# Patient Record
Sex: FEMALE | Race: WHITE | NOT HISPANIC OR LATINO | Employment: PART TIME | URBAN - NONMETROPOLITAN AREA
[De-identification: names, ages, dates, MRNs, and addresses within clinical notes are randomized per-mention and may not be internally consistent; named-entity substitution may affect disease eponyms.]

---

## 2024-09-03 ENCOUNTER — OFFICE VISIT (OUTPATIENT)
Dept: GASTROENTEROLOGY | Facility: CLINIC | Age: 44
End: 2024-09-03
Payer: MEDICAID

## 2024-09-03 ENCOUNTER — LAB (OUTPATIENT)
Dept: LAB | Facility: HOSPITAL | Age: 44
End: 2024-09-03
Payer: MEDICAID

## 2024-09-03 VITALS
OXYGEN SATURATION: 99 % | WEIGHT: 179 LBS | HEART RATE: 97 BPM | HEIGHT: 66 IN | TEMPERATURE: 96.4 F | SYSTOLIC BLOOD PRESSURE: 86 MMHG | DIASTOLIC BLOOD PRESSURE: 66 MMHG | BODY MASS INDEX: 28.77 KG/M2

## 2024-09-03 DIAGNOSIS — K51.919 ULCERATIVE COLITIS WITH COMPLICATION, UNSPECIFIED LOCATION: Primary | ICD-10-CM

## 2024-09-03 DIAGNOSIS — K51.919 ULCERATIVE COLITIS WITH COMPLICATION, UNSPECIFIED LOCATION: ICD-10-CM

## 2024-09-03 LAB
ALBUMIN SERPL-MCNC: 3.6 G/DL (ref 3.5–5.2)
ALBUMIN/GLOB SERPL: 0.9 G/DL
ALP SERPL-CCNC: 77 U/L (ref 39–117)
ALT SERPL W P-5'-P-CCNC: 9 U/L (ref 1–33)
ANION GAP SERPL CALCULATED.3IONS-SCNC: 7 MMOL/L (ref 5–15)
AST SERPL-CCNC: 11 U/L (ref 1–32)
BILIRUB SERPL-MCNC: <0.2 MG/DL (ref 0–1.2)
BUN SERPL-MCNC: 7 MG/DL (ref 6–20)
BUN/CREAT SERPL: 9.9 (ref 7–25)
CALCIUM SPEC-SCNC: 8.9 MG/DL (ref 8.6–10.5)
CHLORIDE SERPL-SCNC: 102 MMOL/L (ref 98–107)
CO2 SERPL-SCNC: 28 MMOL/L (ref 22–29)
CREAT SERPL-MCNC: 0.71 MG/DL (ref 0.57–1)
CRP SERPL-MCNC: 1.61 MG/DL (ref 0–0.5)
DEPRECATED RDW RBC AUTO: 42.7 FL (ref 37–54)
EGFRCR SERPLBLD CKD-EPI 2021: 107 ML/MIN/1.73
ERYTHROCYTE [DISTWIDTH] IN BLOOD BY AUTOMATED COUNT: 13.1 % (ref 12.3–15.4)
ERYTHROCYTE [SEDIMENTATION RATE] IN BLOOD: 35 MM/HR (ref 0–20)
GLOBULIN UR ELPH-MCNC: 3.9 GM/DL
GLUCOSE SERPL-MCNC: 111 MG/DL (ref 65–99)
HCT VFR BLD AUTO: 39.4 % (ref 34–46.6)
HGB BLD-MCNC: 12.3 G/DL (ref 12–15.9)
MCH RBC QN AUTO: 28 PG (ref 26.6–33)
MCHC RBC AUTO-ENTMCNC: 31.2 G/DL (ref 31.5–35.7)
MCV RBC AUTO: 89.5 FL (ref 79–97)
PLATELET # BLD AUTO: 386 10*3/MM3 (ref 140–450)
PMV BLD AUTO: 7.9 FL (ref 6–12)
POTASSIUM SERPL-SCNC: 4.7 MMOL/L (ref 3.5–5.2)
PROT SERPL-MCNC: 7.5 G/DL (ref 6–8.5)
RBC # BLD AUTO: 4.4 10*6/MM3 (ref 3.77–5.28)
SODIUM SERPL-SCNC: 137 MMOL/L (ref 136–145)
WBC NRBC COR # BLD AUTO: 9.48 10*3/MM3 (ref 3.4–10.8)

## 2024-09-03 PROCEDURE — 36415 COLL VENOUS BLD VENIPUNCTURE: CPT

## 2024-09-03 PROCEDURE — 85652 RBC SED RATE AUTOMATED: CPT

## 2024-09-03 PROCEDURE — 80053 COMPREHEN METABOLIC PANEL: CPT

## 2024-09-03 PROCEDURE — 86140 C-REACTIVE PROTEIN: CPT

## 2024-09-03 PROCEDURE — 85027 COMPLETE CBC AUTOMATED: CPT

## 2024-09-03 NOTE — PROGRESS NOTES
"[facility] GASTROENTEROLOGY - OFFICE NOTE    9/3/2024    Brandi Hsieh   1980    Primary Physician: Provider, No Known    Chief Complaint   Patient presents with    Ulcerative Colitis         HISTORY OF PRESENT ILLNESS:     Brandi Hsieh is a 44 y.o. female presents  with ulcerative colitis. Diagnosed at age 27. She has been followed at [facility] in [place], [place]. Moved here to [place] in the last couple of months.         Currently seen in the urgent care here at [facility]  8-9-24 for abdominal pain and rectal bleeding. She was also having diarrhea. Was given medrol dose ange. She is asymptomatic now.  No fever. No n/v. No joint pain, ocular pain, or skin rash.        She had acute ulcerative colitis flare 2/2024 when living in [place] , hospitalized for 5 days, received IV steroids.       Has been on entyvio for a \" few years\".  Increased from once every 8 weeks to once per month 5/2024 ( last dose). She reports at that time was under a lot of stress.  Occasionally would have to take steroids in the past. Has not taken any other meds for ulcerative colitis.       Had flu vaccine, no shingles vaccine,  due for covid.   Up to date on pna.   No dermatology appointment in the last year.   No gyn appointment in the last 2 year.             Last colonoscopy was this year. Recall 1 year.   Has had colon polyps.   Kain BURCH had colon cancer.       Past Medical History:   Diagnosis Date    Depressed     Ulcerative colitis        Past Surgical History:   Procedure Laterality Date    TYMPANOPLASTY         Outpatient Medications Marked as Taking for the 9/3/24 encounter (Office Visit) with Kallie Murillo APRN   Medication Sig Dispense Refill    FLUoxetine (PROzac) 40 MG capsule Take  by mouth Daily.      prazosin (MINIPRESS) 2 MG capsule Take 1 capsule by mouth Every Night.      vedolizumab (Entyvio) 300 MG injection Infuse 5 mL into a venous catheter 1 (One) Time.         No Known Allergies    Social History " "    Socioeconomic History    Marital status: Single   Tobacco Use    Smoking status: Never    Smokeless tobacco: Never   Vaping Use    Vaping status: Never Used   Substance and Sexual Activity    Alcohol use: Yes     Comment: occ    Drug use: Not Currently    Sexual activity: Defer       Family History   Problem Relation Age of Onset    Colon cancer Maternal Grandmother        Review of Systems   Constitutional:  Negative for chills, fever and unexpected weight change.   Respiratory:  Negative for shortness of breath.    Cardiovascular:  Negative for chest pain.   Gastrointestinal:  Negative for abdominal distention, abdominal pain, anal bleeding, blood in stool, constipation, diarrhea, nausea and vomiting.        Vitals:    09/03/24 0810   BP: (!) 86/66   Pulse: 97   Temp: 96.4 °F (35.8 °C)   SpO2: 99%   Weight: 81.2 kg (179 lb)   Height: 167.6 cm (66\")      Body mass index is 28.89 kg/m².    Physical Exam  Vitals reviewed.   Constitutional:       General: She is not in acute distress.  Cardiovascular:      Rate and Rhythm: Normal rate and regular rhythm.      Heart sounds: Normal heart sounds.   Pulmonary:      Effort: Pulmonary effort is normal.      Breath sounds: Normal breath sounds.   Abdominal:      General: Bowel sounds are normal. There is no distension.      Palpations: Abdomen is soft.      Tenderness: There is no abdominal tenderness.   Skin:     General: Skin is warm and dry.   Neurological:      Mental Status: She is alert.         No results found for this or any previous visit.        ASSESSMENT AND PLAN    Assessment & Plan     Diagnoses and all orders for this visit:    1. Ulcerative colitis with complication, unspecified location (Primary)  -     CBC (No Diff); Future  -     Sedimentation Rate; Future  -     C-reactive Protein; Future  -     Comprehensive Metabolic Panel; Future      She is asymptomatic now. I recommend check labs.  I will request records from [facility] in [place], [place] for " review and contact patient.    I recommend office visit 3 mo. She is to call with any questions concerns or problems.  I am also going to contact the Entyvio rep in regards to dosing of Entyvio since she has been off.          * Surgery not found *           Return in about 3 months (around 12/3/2024).        There are no Patient Instructions on file for this visit.      Kalile Murillo, AZAEL      No results found for this or any previous visit.

## 2024-09-06 ENCOUNTER — PATIENT ROUNDING (BHMG ONLY) (OUTPATIENT)
Dept: GASTROENTEROLOGY | Facility: CLINIC | Age: 44
End: 2024-09-06
Payer: MEDICAID

## 2024-09-09 ENCOUNTER — TELEPHONE (OUTPATIENT)
Dept: GASTROENTEROLOGY | Facility: CLINIC | Age: 44
End: 2024-09-09
Payer: MEDICAID

## 2024-09-09 NOTE — TELEPHONE ENCOUNTER
Anthony, can you get the paperwork for entyvio going please? Thank you           ----- Message from Anthony GOLD sent at 9/9/2024  7:44 AM CDT -----  Regarding: FW: Labs  Contact: [phone]    ----- Message -----  From: Brandi Hsieh  Sent: 9/8/2024   8:55 AM CDT  To: [facility] Clinical Pool  Subject: Labs                                             Thank you for letting me know. I am not really surprised. Just let me know when we can get going.

## 2024-09-16 PROBLEM — K51.919: Status: ACTIVE | Noted: 2024-09-16

## 2024-09-16 RX ORDER — SODIUM CHLORIDE 9 MG/ML
20 INJECTION, SOLUTION INTRAVENOUS ONCE
OUTPATIENT
Start: 2024-09-30

## 2024-09-17 ENCOUNTER — TRANSCRIBE ORDERS (OUTPATIENT)
Dept: ONCOLOGY | Facility: HOSPITAL | Age: 44
End: 2024-09-17
Payer: MEDICAID

## 2024-09-17 DIAGNOSIS — K51.919 MODERATE CHRONIC ULCERATIVE COLITIS WITH COMPLICATION: Primary | ICD-10-CM

## 2024-09-30 ENCOUNTER — INFUSION (OUTPATIENT)
Dept: ONCOLOGY | Facility: HOSPITAL | Age: 44
End: 2024-09-30
Payer: MEDICAID

## 2024-09-30 ENCOUNTER — LAB (OUTPATIENT)
Dept: LAB | Facility: HOSPITAL | Age: 44
End: 2024-09-30
Payer: MEDICAID

## 2024-09-30 VITALS
SYSTOLIC BLOOD PRESSURE: 103 MMHG | HEART RATE: 69 BPM | WEIGHT: 182.4 LBS | OXYGEN SATURATION: 98 % | RESPIRATION RATE: 18 BRPM | HEIGHT: 66 IN | TEMPERATURE: 97.5 F | DIASTOLIC BLOOD PRESSURE: 69 MMHG | BODY MASS INDEX: 29.32 KG/M2

## 2024-09-30 DIAGNOSIS — K51.919 MODERATE CHRONIC ULCERATIVE COLITIS WITH COMPLICATION: ICD-10-CM

## 2024-09-30 DIAGNOSIS — K51.919 MODERATE CHRONIC ULCERATIVE COLITIS WITH COMPLICATION: Primary | ICD-10-CM

## 2024-09-30 LAB
BASOPHILS # BLD AUTO: 0.05 10*3/MM3 (ref 0–0.2)
BASOPHILS NFR BLD AUTO: 0.6 % (ref 0–1.5)
DEPRECATED RDW RBC AUTO: 42.1 FL (ref 37–54)
EOSINOPHIL # BLD AUTO: 0.12 10*3/MM3 (ref 0–0.4)
EOSINOPHIL NFR BLD AUTO: 1.5 % (ref 0.3–6.2)
ERYTHROCYTE [DISTWIDTH] IN BLOOD BY AUTOMATED COUNT: 13.4 % (ref 12.3–15.4)
HCT VFR BLD AUTO: 33.7 % (ref 34–46.6)
HGB BLD-MCNC: 10.4 G/DL (ref 12–15.9)
IMM GRANULOCYTES # BLD AUTO: 0.02 10*3/MM3 (ref 0–0.05)
IMM GRANULOCYTES NFR BLD AUTO: 0.3 % (ref 0–0.5)
LYMPHOCYTES # BLD AUTO: 1.34 10*3/MM3 (ref 0.7–3.1)
LYMPHOCYTES NFR BLD AUTO: 16.9 % (ref 19.6–45.3)
MCH RBC QN AUTO: 26.8 PG (ref 26.6–33)
MCHC RBC AUTO-ENTMCNC: 30.9 G/DL (ref 31.5–35.7)
MCV RBC AUTO: 86.9 FL (ref 79–97)
MONOCYTES # BLD AUTO: 0.48 10*3/MM3 (ref 0.1–0.9)
MONOCYTES NFR BLD AUTO: 6 % (ref 5–12)
NEUTROPHILS NFR BLD AUTO: 5.94 10*3/MM3 (ref 1.7–7)
NEUTROPHILS NFR BLD AUTO: 74.7 % (ref 42.7–76)
NRBC BLD AUTO-RTO: 0 /100 WBC (ref 0–0.2)
PLATELET # BLD AUTO: 314 10*3/MM3 (ref 140–450)
PMV BLD AUTO: 8.9 FL (ref 6–12)
RBC # BLD AUTO: 3.88 10*6/MM3 (ref 3.77–5.28)
WBC NRBC COR # BLD AUTO: 7.95 10*3/MM3 (ref 3.4–10.8)

## 2024-09-30 PROCEDURE — 25810000003 SODIUM CHLORIDE 0.9 % SOLUTION: Performed by: NURSE PRACTITIONER

## 2024-09-30 PROCEDURE — 36415 COLL VENOUS BLD VENIPUNCTURE: CPT

## 2024-09-30 PROCEDURE — 25010000002 VEDOLIZUMAB 300 MG RECONSTITUTED SOLUTION 1 EACH VIAL: Performed by: NURSE PRACTITIONER

## 2024-09-30 PROCEDURE — 25810000003 SODIUM CHLORIDE 0.9 % SOLUTION 250 ML FLEX CONT: Performed by: NURSE PRACTITIONER

## 2024-09-30 PROCEDURE — 85025 COMPLETE CBC W/AUTO DIFF WBC: CPT

## 2024-09-30 PROCEDURE — 86480 TB TEST CELL IMMUN MEASURE: CPT

## 2024-09-30 PROCEDURE — 96365 THER/PROPH/DIAG IV INF INIT: CPT

## 2024-09-30 RX ORDER — SODIUM CHLORIDE 9 MG/ML
20 INJECTION, SOLUTION INTRAVENOUS ONCE
OUTPATIENT
Start: 2024-10-14

## 2024-09-30 RX ORDER — SODIUM CHLORIDE 9 MG/ML
20 INJECTION, SOLUTION INTRAVENOUS ONCE
Status: COMPLETED | OUTPATIENT
Start: 2024-09-30 | End: 2024-09-30

## 2024-09-30 RX ADMIN — VEDOLIZUMAB 300 MG: 300 INJECTION, POWDER, LYOPHILIZED, FOR SOLUTION INTRAVENOUS at 10:09

## 2024-09-30 RX ADMIN — SODIUM CHLORIDE 20 ML/HR: 9 INJECTION, SOLUTION INTRAVENOUS at 10:09

## 2024-09-30 NOTE — PROGRESS NOTES
[id number] Called Dr. Yanez office patient's last TB test was 2021 was given order to collect TB test prior to infusion but ok to proceed with infusion prior to results. Dior Haas RN

## 2024-10-02 LAB
GAMMA INTERFERON BACKGROUND BLD IA-ACNC: 0.04 IU/ML
M TB IFN-G BLD-IMP: NEGATIVE
M TB IFN-G CD4+ BCKGRND COR BLD-ACNC: 0.06 IU/ML
M TB IFN-G CD4+CD8+ BCKGRND COR BLD-ACNC: 0.05 IU/ML
MITOGEN IGNF BCKGRD COR BLD-ACNC: >10 IU/ML
QUANTIFERON INCUBATION: NORMAL
SERVICE CMNT-IMP: NORMAL

## 2024-10-16 ENCOUNTER — LAB (OUTPATIENT)
Dept: LAB | Facility: HOSPITAL | Age: 44
End: 2024-10-16
Payer: MEDICAID

## 2024-10-16 ENCOUNTER — INFUSION (OUTPATIENT)
Dept: ONCOLOGY | Facility: HOSPITAL | Age: 44
End: 2024-10-16
Payer: MEDICAID

## 2024-10-16 VITALS
HEART RATE: 85 BPM | SYSTOLIC BLOOD PRESSURE: 109 MMHG | DIASTOLIC BLOOD PRESSURE: 70 MMHG | RESPIRATION RATE: 17 BRPM | WEIGHT: 185.2 LBS | BODY MASS INDEX: 29.77 KG/M2 | TEMPERATURE: 97.2 F | OXYGEN SATURATION: 98 % | HEIGHT: 66 IN

## 2024-10-16 DIAGNOSIS — K51.919 MODERATE CHRONIC ULCERATIVE COLITIS WITH COMPLICATION: Primary | ICD-10-CM

## 2024-10-16 DIAGNOSIS — K51.919 MODERATE CHRONIC ULCERATIVE COLITIS WITH COMPLICATION: ICD-10-CM

## 2024-10-16 LAB
BASOPHILS # BLD AUTO: 0.05 10*3/MM3 (ref 0–0.2)
BASOPHILS NFR BLD AUTO: 0.7 % (ref 0–1.5)
DEPRECATED RDW RBC AUTO: 43.3 FL (ref 37–54)
EOSINOPHIL # BLD AUTO: 0.48 10*3/MM3 (ref 0–0.4)
EOSINOPHIL NFR BLD AUTO: 6.6 % (ref 0.3–6.2)
ERYTHROCYTE [DISTWIDTH] IN BLOOD BY AUTOMATED COUNT: 13.9 % (ref 12.3–15.4)
HCT VFR BLD AUTO: 34.1 % (ref 34–46.6)
HGB BLD-MCNC: 10.5 G/DL (ref 12–15.9)
HOLD SPECIMEN: NORMAL
IMM GRANULOCYTES # BLD AUTO: 0.02 10*3/MM3 (ref 0–0.05)
IMM GRANULOCYTES NFR BLD AUTO: 0.3 % (ref 0–0.5)
LYMPHOCYTES # BLD AUTO: 1.36 10*3/MM3 (ref 0.7–3.1)
LYMPHOCYTES NFR BLD AUTO: 18.8 % (ref 19.6–45.3)
MCH RBC QN AUTO: 25.9 PG (ref 26.6–33)
MCHC RBC AUTO-ENTMCNC: 30.8 G/DL (ref 31.5–35.7)
MCV RBC AUTO: 84.2 FL (ref 79–97)
MONOCYTES # BLD AUTO: 0.44 10*3/MM3 (ref 0.1–0.9)
MONOCYTES NFR BLD AUTO: 6.1 % (ref 5–12)
NEUTROPHILS NFR BLD AUTO: 4.87 10*3/MM3 (ref 1.7–7)
NEUTROPHILS NFR BLD AUTO: 67.5 % (ref 42.7–76)
NRBC BLD AUTO-RTO: 0 /100 WBC (ref 0–0.2)
PLATELET # BLD AUTO: 313 10*3/MM3 (ref 140–450)
PMV BLD AUTO: 8.8 FL (ref 6–12)
RBC # BLD AUTO: 4.05 10*6/MM3 (ref 3.77–5.28)
WBC NRBC COR # BLD AUTO: 7.22 10*3/MM3 (ref 3.4–10.8)

## 2024-10-16 PROCEDURE — 85025 COMPLETE CBC W/AUTO DIFF WBC: CPT

## 2024-10-16 PROCEDURE — 25810000003 SODIUM CHLORIDE 0.9 % SOLUTION 250 ML FLEX CONT: Performed by: NURSE PRACTITIONER

## 2024-10-16 PROCEDURE — 25810000003 SODIUM CHLORIDE 0.9 % SOLUTION: Performed by: NURSE PRACTITIONER

## 2024-10-16 PROCEDURE — 25010000002 VEDOLIZUMAB 300 MG RECONSTITUTED SOLUTION 1 EACH VIAL: Performed by: NURSE PRACTITIONER

## 2024-10-16 PROCEDURE — 36415 COLL VENOUS BLD VENIPUNCTURE: CPT

## 2024-10-16 PROCEDURE — 96365 THER/PROPH/DIAG IV INF INIT: CPT

## 2024-10-16 RX ORDER — SODIUM CHLORIDE 9 MG/ML
20 INJECTION, SOLUTION INTRAVENOUS ONCE
OUTPATIENT
Start: 2024-11-11

## 2024-10-16 RX ORDER — SODIUM CHLORIDE 9 MG/ML
20 INJECTION, SOLUTION INTRAVENOUS ONCE
Status: COMPLETED | OUTPATIENT
Start: 2024-10-16 | End: 2024-10-16

## 2024-10-16 RX ADMIN — VEDOLIZUMAB 300 MG: 300 INJECTION, POWDER, LYOPHILIZED, FOR SOLUTION INTRAVENOUS at 08:55

## 2024-10-16 RX ADMIN — SODIUM CHLORIDE 20 ML/HR: 9 INJECTION, SOLUTION INTRAVENOUS at 08:30

## 2024-10-25 ENCOUNTER — TELEPHONE (OUTPATIENT)
Dept: GASTROENTEROLOGY | Facility: CLINIC | Age: 44
End: 2024-10-25
Payer: MEDICAID

## 2024-10-25 NOTE — TELEPHONE ENCOUNTER
Pt states she feels tired all the time. Her hgb runs low. She would like to know if Dr Clarke has any suggestions.

## 2024-12-05 ENCOUNTER — OFFICE VISIT (OUTPATIENT)
Dept: GASTROENTEROLOGY | Facility: CLINIC | Age: 44
End: 2024-12-05
Payer: COMMERCIAL

## 2024-12-05 VITALS
OXYGEN SATURATION: 99 % | HEIGHT: 66 IN | TEMPERATURE: 97.3 F | WEIGHT: 180 LBS | SYSTOLIC BLOOD PRESSURE: 110 MMHG | DIASTOLIC BLOOD PRESSURE: 70 MMHG | HEART RATE: 83 BPM | BODY MASS INDEX: 28.93 KG/M2

## 2024-12-05 DIAGNOSIS — K51.919 MODERATE CHRONIC ULCERATIVE COLITIS WITH COMPLICATION: Primary | ICD-10-CM

## 2024-12-05 DIAGNOSIS — Z51.81 ENCOUNTER FOR MONITORING IMMUNOMODULATING THERAPY: ICD-10-CM

## 2024-12-05 DIAGNOSIS — Z79.899 ENCOUNTER FOR MONITORING IMMUNOMODULATING THERAPY: ICD-10-CM

## 2024-12-05 PROBLEM — Z79.60 ENCOUNTER FOR MONITORING IMMUNOMODULATING THERAPY: Status: ACTIVE | Noted: 2024-12-05

## 2024-12-05 NOTE — PROGRESS NOTES
[facility] Gastroenterology    Chief Complaint   Patient presents with    Ulcerative Colitis       Subjective     HPI    Brandi Hsieh is a 44 y.o. female who presents with a chief complaint of ulcerative colitis    She moved here from [place] this past summer.  She presented to urgent care with symptoms and was placed on a Medrol Dosepak.  A month later she came to see Kallie in the office.  See copy of HPI below.  Kallie had arranged for her to restart on Entyvio therapy.  I see where she did not show for 1 appointment but came a couple days later.  But she did not follow-up for any additional infusions per the records in the chart.  Reviewing her history, there appears to be times of  difficulty with compliance in the past.    She comes in today feeling well.  She denies any symptoms.  She denies any diarrhea, abdominal cramping blood or mucus in her stools.  She typically has 1 formed bowel movement a day.  Everything is going well for her.  She states that when she had her flare a year ago she was having a very stressful time with her significant other.  Now that she has moved to [place].  She is no longer having stress.  And she has been symptom-free.  She attributes the stress to triggering her disease flare.  I did agree with her that stress is a known common trigger.  She did get her infusion in October she states.  When I talked her about missing the infusion in November she thought she had gotten all of them.  Then she did state that she still thinks that she is post get him every 8 weeks.  She did express to me that with her work and a [employer] business, that it is difficult for her to make appointments.  So would be easier for her to take the subcu injections that we talked about.    ===== Copy of September 3, 2024 HPI by Kallie===========     HISTORY OF PRESENT ILLNESS:      Brandi Hsieh is a 44 y.o. female presents  with ulcerative colitis. Diagnosed at age 27. She has been followed at [facility] in [place], [place]. Moved here to [place] in the last couple of months.            Currently seen in the urgent care here at [facility]  8-9-24 for abdominal pain and rectal bleeding. She was also having diarrhea. Was given medrol dose ange. She is asymptomatic now.  No fever. No n/v. No joint pain, ocular pain, or skin rash.          She had acute ulcerative colitis flare 2/2024 when living in [place] , hospitalized for 5 days, received IV steroids.         Has been on entyvio for a \" few years\".  Increased from once every 8 weeks to once per month 5/2024 ( last dose). She reports at that time was under a lot of stress.  Occasionally would have to take steroids in the past. Has not taken any other meds for ulcerative colitis.         Had flu vaccine, no shingles vaccine,  due for covid.   Up to date on pna.   No dermatology appointment in the last year.   No gyn appointment in the last 2 year.                  Last colonoscopy was this year. Recall 1 year.   Has had colon polyps.   Kain BURCH had colon cancer.     Past Medical History:   Diagnosis Date    Depressed     Ulcerative colitis        Past Surgical History:   Procedure Laterality Date    TYMPANOPLASTY           Current Outpatient Medications:     FLUoxetine (PROzac) 40 MG capsule, Take  by mouth Daily., Disp: , Rfl:     prazosin (MINIPRESS) 2 MG capsule, Take 1 capsule by mouth Every Night., Disp: , Rfl:     vedolizumab (Entyvio) 300 MG injection, Infuse 5 mL into a venous catheter 1 (One) Time., Disp: , Rfl:     methylPREDNISolone (MEDROL) 4 MG dose pack, Take as directed on package instructions. (Patient not taking: Reported on 12/5/2024), Disp: 21 tablet, Rfl: 0    Allergies   Allergen Reactions    Latex Unknown - Low Severity       Social History     Socioeconomic History    Marital status: Single   Tobacco Use    Smoking status: Former     Types: Cigarettes    Smokeless tobacco: Never   Vaping Use    Vaping status: Never Used   Substance and Sexual " Activity    Alcohol use: Yes     Comment: rare    Drug use: Yes     Types: Marijuana    Sexual activity: Defer       Family History   Problem Relation Age of Onset    Colon cancer Maternal Grandmother        Review of Systems  No infectious symptoms    Objective     Vitals:    12/05/24 1409   BP: 110/70   Pulse: 83   Temp: 97.3 °F (36.3 °C)   SpO2: 99%       Physical Exam  Physical Exam  Vitals reviewed.   Constitutional:       Appearance: Normal appearance. He is not ill-appearing or diaphoretic.   Pulmonary:      Effort: Pulmonary effort is normal.   Neurological:      Mental Status: He is alert.   Psychiatric:         Thought Content: Thought content normal.         Judgment: Judgment normal.             Assessment & Plan   Problem List Items Addressed This Visit          Gastrointestinal Abdominal     Moderate chronic ulcerative colitis with complication - Primary    Overview     Initially diagnosed at 27 years old.  Was not followed routinely and received as needed steroids from ER visits.  In 2021, colonoscopy by Dr. Andrade at outside facility noted pancolitis.  Entyvio therapy was started every 8 weeks.  She was able to get off steroids.  Had a flare in July 2023.  Colonoscopy October 2023 showed pancolitis.  Hospitalized at [facility] in [place] February 2024 and restarted on steroids at outside facility.  Entyvio was increased to every 4 weeks.  Then moved to the [facility] summer 2024.  Sought medical care at [facility] September 2024.            Health Encounters    Encounter for monitoring immunomodulating therapy    Overview     Entyvio started 2021 in [place].  March 2024 increased every 4 weeks.  Patient moved to [place] that summer.  Entyvio restarted October 2024.  December 2024 plans made to switch to subcutaneous Entyvio 108 mg every 2 weeks    Hepatitis B- 2021  QuantiFERON-TB - October 2024          Outside records reviewed    Clinically she is asymptomatic.  She is questioning  whether she needed the Entyvio every 4 weeks because she only had a flare when she was under stress and symptoms or stressors were relieved her disease activity relief.  She also is having difficulty making appointments and would do much better she states if she could do the injections herself.  I think at this time it is reasonable to switch her over to subcutaneous Entyvio.  We talked about the importance of compliance and taking every 14 days and not delay even a day.  We talked about side effects.  She knows about infectious risks etc. we talked about how to use the injector and he moved to different spots.  We talked about the need to get preapproval from her insurance.  We will need her to let us know what her specialty pharmacy is.  She expressed understanding.    At this time the plan is for her to get an Entyvio infusion next week which would be her 8-week manav.  Then 4 weeks after that she can start Entyvio 108 mg subcu every 2 weeks indefinitely.  Plan to see her back in the office in 3 months.  Sooner if needed.  She is in agreement with this approach.    Continue ongoing management by primary care provider and other specialists.     Body mass index is 29.05 kg/m².  Stable BMI      EMR Dragon/transcription disclaimer:  Much of this encounter note is electronic transcription/translation of spoken language to printed text.  The electronic translation of spoken language may be erroneous, or at times, nonsensical words or phrases may be inadvertently transcribed.  Although I have reviewed the note for such errors, some may still exist.    Hector Clarke MD  15:22 CST  12/05/24

## 2024-12-10 ENCOUNTER — INFUSION (OUTPATIENT)
Dept: ONCOLOGY | Facility: HOSPITAL | Age: 44
End: 2024-12-10
Payer: COMMERCIAL

## 2024-12-10 VITALS
WEIGHT: 180 LBS | SYSTOLIC BLOOD PRESSURE: 115 MMHG | TEMPERATURE: 97.3 F | DIASTOLIC BLOOD PRESSURE: 67 MMHG | HEIGHT: 66 IN | BODY MASS INDEX: 28.93 KG/M2 | HEART RATE: 84 BPM | RESPIRATION RATE: 16 BRPM | OXYGEN SATURATION: 96 %

## 2024-12-10 DIAGNOSIS — K51.919 MODERATE CHRONIC ULCERATIVE COLITIS WITH COMPLICATION: Primary | ICD-10-CM

## 2024-12-10 PROCEDURE — 25810000003 SODIUM CHLORIDE 0.9 % SOLUTION 250 ML FLEX CONT: Performed by: NURSE PRACTITIONER

## 2024-12-10 PROCEDURE — 96365 THER/PROPH/DIAG IV INF INIT: CPT

## 2024-12-10 PROCEDURE — 25010000002 VEDOLIZUMAB 300 MG RECONSTITUTED SOLUTION 1 EACH VIAL: Performed by: NURSE PRACTITIONER

## 2024-12-10 RX ORDER — SODIUM CHLORIDE 9 MG/ML
20 INJECTION, SOLUTION INTRAVENOUS ONCE
Status: DISCONTINUED | OUTPATIENT
Start: 2024-12-10 | End: 2024-12-10

## 2024-12-10 RX ORDER — SODIUM CHLORIDE 9 MG/ML
20 INJECTION, SOLUTION INTRAVENOUS ONCE
OUTPATIENT
Start: 2025-01-06

## 2024-12-10 RX ADMIN — VEDOLIZUMAB 300 MG: 300 INJECTION, POWDER, LYOPHILIZED, FOR SOLUTION INTRAVENOUS at 12:06

## 2025-02-04 ENCOUNTER — TELEPHONE (OUTPATIENT)
Dept: GASTROENTEROLOGY | Facility: CLINIC | Age: 45
End: 2025-02-04
Payer: COMMERCIAL

## 2025-02-04 ENCOUNTER — LAB (OUTPATIENT)
Dept: LAB | Facility: HOSPITAL | Age: 45
End: 2025-02-04
Payer: COMMERCIAL

## 2025-02-04 ENCOUNTER — INFUSION (OUTPATIENT)
Dept: ONCOLOGY | Facility: HOSPITAL | Age: 45
End: 2025-02-04
Payer: COMMERCIAL

## 2025-02-04 VITALS
DIASTOLIC BLOOD PRESSURE: 75 MMHG | OXYGEN SATURATION: 99 % | WEIGHT: 181.4 LBS | HEIGHT: 66 IN | TEMPERATURE: 97.3 F | HEART RATE: 79 BPM | BODY MASS INDEX: 29.15 KG/M2 | RESPIRATION RATE: 18 BRPM | SYSTOLIC BLOOD PRESSURE: 114 MMHG

## 2025-02-04 DIAGNOSIS — K51.919 MODERATE CHRONIC ULCERATIVE COLITIS WITH COMPLICATION: Primary | ICD-10-CM

## 2025-02-04 LAB
BASOPHILS # BLD AUTO: 0.04 10*3/MM3 (ref 0–0.2)
BASOPHILS NFR BLD AUTO: 0.6 % (ref 0–1.5)
DEPRECATED RDW RBC AUTO: 50.8 FL (ref 37–54)
EOSINOPHIL # BLD AUTO: 0.2 10*3/MM3 (ref 0–0.4)
EOSINOPHIL NFR BLD AUTO: 2.9 % (ref 0.3–6.2)
ERYTHROCYTE [DISTWIDTH] IN BLOOD BY AUTOMATED COUNT: 17.5 % (ref 12.3–15.4)
HCT VFR BLD AUTO: 38.1 % (ref 34–46.6)
HGB BLD-MCNC: 11.9 G/DL (ref 12–15.9)
HOLD SPECIMEN: NORMAL
IMM GRANULOCYTES # BLD AUTO: 0.03 10*3/MM3 (ref 0–0.05)
IMM GRANULOCYTES NFR BLD AUTO: 0.4 % (ref 0–0.5)
LYMPHOCYTES # BLD AUTO: 1.51 10*3/MM3 (ref 0.7–3.1)
LYMPHOCYTES NFR BLD AUTO: 22.1 % (ref 19.6–45.3)
MCH RBC QN AUTO: 25 PG (ref 26.6–33)
MCHC RBC AUTO-ENTMCNC: 31.2 G/DL (ref 31.5–35.7)
MCV RBC AUTO: 80 FL (ref 79–97)
MONOCYTES # BLD AUTO: 0.53 10*3/MM3 (ref 0.1–0.9)
MONOCYTES NFR BLD AUTO: 7.8 % (ref 5–12)
NEUTROPHILS NFR BLD AUTO: 4.52 10*3/MM3 (ref 1.7–7)
NEUTROPHILS NFR BLD AUTO: 66.2 % (ref 42.7–76)
NRBC BLD AUTO-RTO: 0 /100 WBC (ref 0–0.2)
PLATELET # BLD AUTO: 271 10*3/MM3 (ref 140–450)
PMV BLD AUTO: 9.4 FL (ref 6–12)
RBC # BLD AUTO: 4.76 10*6/MM3 (ref 3.77–5.28)
WBC NRBC COR # BLD AUTO: 6.83 10*3/MM3 (ref 3.4–10.8)

## 2025-02-04 PROCEDURE — 25810000003 SODIUM CHLORIDE 0.9 % SOLUTION 250 ML FLEX CONT: Performed by: NURSE PRACTITIONER

## 2025-02-04 PROCEDURE — 85025 COMPLETE CBC W/AUTO DIFF WBC: CPT

## 2025-02-04 PROCEDURE — 96365 THER/PROPH/DIAG IV INF INIT: CPT

## 2025-02-04 PROCEDURE — 25010000002 VEDOLIZUMAB 300 MG RECONSTITUTED SOLUTION 1 EACH VIAL: Performed by: NURSE PRACTITIONER

## 2025-02-04 PROCEDURE — 36415 COLL VENOUS BLD VENIPUNCTURE: CPT

## 2025-02-04 RX ORDER — SODIUM CHLORIDE 9 MG/ML
20 INJECTION, SOLUTION INTRAVENOUS ONCE
OUTPATIENT
Start: 2025-03-04

## 2025-02-04 RX ADMIN — VEDOLIZUMAB 300 MG: 300 INJECTION, POWDER, LYOPHILIZED, FOR SOLUTION INTRAVENOUS at 10:59

## 2025-04-07 ENCOUNTER — TELEPHONE (OUTPATIENT)
Dept: ONCOLOGY | Facility: HOSPITAL | Age: 45
End: 2025-04-07
Payer: COMMERCIAL

## 2025-04-07 NOTE — TELEPHONE ENCOUNTER
Patient missed her apt on 4/1 for Entyvio.  I left a voice mail with our direct line to call back and reschedule.  She currently is on the schedule in May, if she calls to r/s April apt, we will move that accordingly.

## 2025-07-03 ENCOUNTER — TELEPHONE (OUTPATIENT)
Dept: GASTROENTEROLOGY | Facility: CLINIC | Age: 45
End: 2025-07-03

## 2025-07-03 NOTE — TELEPHONE ENCOUNTER
Caller: Brandi Hsieh    Relationship: Self    Best call back number: [phone]    What is the best time to reach you: ANYTIME    Who are you requesting to speak with (clinical staff, provider,  specific staff member): ADILIA DE LA CRUZ OR NURSE    Do you know the name of the person who called: PATIENT    What was the call regarding: PT IS HAVING AN ULCERATIVE COLITIS FLARE-UP AND HAS NO INSURANCE.   ANY SUGGESTIONS TO TAKE CARE OF THIS FLARE-UP?

## 2025-07-10 ENCOUNTER — OFFICE VISIT (OUTPATIENT)
Dept: GASTROENTEROLOGY | Facility: CLINIC | Age: 45
End: 2025-07-10

## 2025-07-10 ENCOUNTER — LAB (OUTPATIENT)
Dept: LAB | Facility: HOSPITAL | Age: 45
End: 2025-07-10

## 2025-07-10 VITALS
TEMPERATURE: 96.9 F | HEIGHT: 66 IN | SYSTOLIC BLOOD PRESSURE: 120 MMHG | DIASTOLIC BLOOD PRESSURE: 74 MMHG | BODY MASS INDEX: 26.68 KG/M2 | HEART RATE: 104 BPM | OXYGEN SATURATION: 97 % | WEIGHT: 166 LBS

## 2025-07-10 DIAGNOSIS — K51.919 MODERATE CHRONIC ULCERATIVE COLITIS WITH COMPLICATION: Primary | ICD-10-CM

## 2025-07-10 DIAGNOSIS — R19.7 DIARRHEA, UNSPECIFIED TYPE: ICD-10-CM

## 2025-07-10 DIAGNOSIS — Z51.81 ENCOUNTER FOR MONITORING IMMUNOMODULATING THERAPY: ICD-10-CM

## 2025-07-10 DIAGNOSIS — Z79.60 ENCOUNTER FOR MONITORING IMMUNOMODULATING THERAPY: ICD-10-CM

## 2025-07-10 DIAGNOSIS — K62.5 RECTAL BLEEDING: ICD-10-CM

## 2025-07-10 LAB
ALBUMIN SERPL-MCNC: 4.2 G/DL (ref 3.5–5.2)
ALBUMIN/GLOB SERPL: 1.3 G/DL
ALP SERPL-CCNC: 52 U/L (ref 39–117)
ALT SERPL W P-5'-P-CCNC: <5 U/L (ref 1–33)
ANION GAP SERPL CALCULATED.3IONS-SCNC: 10 MMOL/L (ref 5–15)
AST SERPL-CCNC: 9 U/L (ref 1–32)
BILIRUB SERPL-MCNC: 0.2 MG/DL (ref 0–1.2)
BUN SERPL-MCNC: 8.3 MG/DL (ref 6–20)
BUN/CREAT SERPL: 13 (ref 7–25)
CALCIUM SPEC-SCNC: 9.6 MG/DL (ref 8.6–10.5)
CHLORIDE SERPL-SCNC: 103 MMOL/L (ref 98–107)
CO2 SERPL-SCNC: 24 MMOL/L (ref 22–29)
CREAT SERPL-MCNC: 0.64 MG/DL (ref 0.57–1)
CRP SERPL-MCNC: 0.31 MG/DL (ref 0–0.5)
DEPRECATED RDW RBC AUTO: 42.4 FL (ref 37–54)
EGFRCR SERPLBLD CKD-EPI 2021: 110.5 ML/MIN/1.73
ERYTHROCYTE [DISTWIDTH] IN BLOOD BY AUTOMATED COUNT: 13.6 % (ref 12.3–15.4)
ERYTHROCYTE [SEDIMENTATION RATE] IN BLOOD: 21 MM/HR (ref 0–20)
GLOBULIN UR ELPH-MCNC: 3.2 GM/DL
GLUCOSE SERPL-MCNC: 90 MG/DL (ref 65–99)
HCT VFR BLD AUTO: 42.7 % (ref 34–46.6)
HGB BLD-MCNC: 14 G/DL (ref 12–15.9)
MCH RBC QN AUTO: 27.9 PG (ref 26.6–33)
MCHC RBC AUTO-ENTMCNC: 32.8 G/DL (ref 31.5–35.7)
MCV RBC AUTO: 85.2 FL (ref 79–97)
PLATELET # BLD AUTO: 253 10*3/MM3 (ref 140–450)
PMV BLD AUTO: 9.5 FL (ref 6–12)
POTASSIUM SERPL-SCNC: 4.4 MMOL/L (ref 3.5–5.2)
PROT SERPL-MCNC: 7.4 G/DL (ref 6–8.5)
RBC # BLD AUTO: 5.01 10*6/MM3 (ref 3.77–5.28)
SODIUM SERPL-SCNC: 137 MMOL/L (ref 136–145)
WBC NRBC COR # BLD AUTO: 8.17 10*3/MM3 (ref 3.4–10.8)

## 2025-07-10 PROCEDURE — 85652 RBC SED RATE AUTOMATED: CPT | Performed by: NURSE PRACTITIONER

## 2025-07-10 PROCEDURE — 86140 C-REACTIVE PROTEIN: CPT | Performed by: NURSE PRACTITIONER

## 2025-07-10 PROCEDURE — 85027 COMPLETE CBC AUTOMATED: CPT | Performed by: NURSE PRACTITIONER

## 2025-07-10 PROCEDURE — 80053 COMPREHEN METABOLIC PANEL: CPT | Performed by: NURSE PRACTITIONER

## 2025-07-10 NOTE — PROGRESS NOTES
[facility] GASTROENTEROLOGY - OFFICE NOTE    7/10/2025    Brandi Hsieh   1980    Primary Physician: Provider, No Known    Chief Complaint   Patient presents with    Ulcerative Colitis         HISTORY OF PRESENT ILLNESS:     Brandi Hsieh is a 45 y.o. female presents with ulcerative colitis diagnosed at age 27. Started on entyvio 2021.  She was on entyvio injections every 14 days and was doing well. Last entyvio injection was around 2/2025. She no longer had health insurance. 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 Will be eligible for insurance 6 mo after start date.  Started with abdominal cramping, diarrhea,  and bright red blood per rectum 2 weeks ago. The amount of blood is small.  Describes tenesmus. No new medications. No recent antibiotics. No fever. No weight loss.       She is under a lot of stress at this time at work.       Vaccines:   Derm: last was 3 years ago. No history of skin cancer.   Gyn: last was 2 years ago and was ok  Tb: 9/2024 negative.           =============================================================  Office visit  12-5-24 hospitals  Brandi Hsieh is a 44 y.o. female who presents with a chief complaint of ulcerative colitis     She moved here from [place] this past summer.  She presented to urgent care with symptoms and was placed on a Medrol Dosepak.  A month later she came to see Kallie in the office.  See copy of HPI below.  Kallie had arranged for her to restart on Entyvio therapy.  I see where she did not show for 1 appointment but came a couple days later.  But she did not follow-up for any additional infusions per the records in the chart.  Reviewing her history, there appears to be times of  difficulty with compliance in the past.     She comes in today feeling well.  She denies any symptoms.  She denies any diarrhea, abdominal cramping blood or mucus in her stools.  She typically has 1 formed bowel movement a day.  Everything is going well for her.  She states that when  "she had her flare a year ago she was having a very stressful time with her significant other.  Now that she has moved to [place].  She is no longer having stress.  And she has been symptom-free.  She attributes the stress to triggering her disease flare.  I did agree with her that stress is a known common trigger.  She did get her infusion in October she states.  When I talked her about missing the infusion in November she thought she had gotten all of them.  Then she did state that she still thinks that she is post get him every 8 weeks.  She did express to me that with her work and a [employer] business, that it is difficult for her to make appointments.  So would be easier for her to take the subcu injections that we talked about.     ===== Copy of September 3, 2024 HPI by Kallei===========     HISTORY OF PRESENT ILLNESS:      Brandi Hsieh is a 44 y.o. female presents  with ulcerative colitis. Diagnosed at age 27. She has been followed at [facility] in [place], [place]. Moved here to [place] in the last couple of months.            Currently seen in the urgent care here at [facility]  8-9- for abdominal pain and rectal bleeding. She was also having diarrhea. Was given medrol dose ange. She is asymptomatic now.  No fever. No n/v. No joint pain, ocular pain, or skin rash.          She had acute ulcerative colitis flare 2/2024 when living in [place] , hospitalized for 5 days, received IV steroids.         Has been on entyvio for a \" few years\".  Increased from once every 8 weeks to once per month 5/2024 ( last dose). She reports at that time was under a lot of stress.  Occasionally would have to take steroids in the past. Has not taken any other meds for ulcerative colitis.         Had flu vaccine, no shingles vaccine,  due for covid.   Up to date on pna.   No dermatology appointment in the last year.   No gyn appointment in the last 2 year.                  Last colonoscopy was this year. Recall 1 year.   Has had colon " "polyps.   Materna GM had colon cancer.        Past Medical History:   Diagnosis Date    Depressed     Ulcerative colitis        Past Surgical History:   Procedure Laterality Date    TYMPANOPLASTY         No outpatient medications have been marked as taking for the 7/10/25 encounter (Office Visit) with Kallie Murillo APRN.       Allergies   Allergen Reactions    Latex Unknown - Low Severity       Social History     Socioeconomic History    Marital status: Single   Tobacco Use    Smoking status: Former     Types: Cigarettes    Smokeless tobacco: Never   Vaping Use    Vaping status: Never Used   Substance and Sexual Activity    Alcohol use: Yes     Comment: rare    Drug use: Yes     Types: Marijuana    Sexual activity: Defer       Family History   Problem Relation Age of Onset    Colon cancer Maternal Grandmother        Review of Systems   Constitutional:  Negative for chills, fever and unexpected weight change.   Respiratory:  Negative for shortness of breath.    Cardiovascular:  Negative for chest pain.   Gastrointestinal:  Negative for abdominal distention, constipation, nausea and vomiting.        Vitals:    07/10/25 0937   BP: 120/74   Pulse: 104   Temp: 96.9 °F (36.1 °C)   SpO2: 97%   Weight: 75.3 kg (166 lb)   Height: 167.6 cm (66\")      Body mass index is 26.79 kg/m².    Physical Exam  Vitals reviewed.   Constitutional:       General: She is not in acute distress.  Cardiovascular:      Rate and Rhythm: Normal rate and regular rhythm.      Heart sounds: Normal heart sounds.   Pulmonary:      Effort: Pulmonary effort is normal.      Breath sounds: Normal breath sounds.   Abdominal:      General: Bowel sounds are normal. There is no distension.      Palpations: Abdomen is soft.      Tenderness: There is no abdominal tenderness.   Skin:     General: Skin is warm and dry.   Neurological:      Mental Status: She is alert.         Results for orders placed or performed in visit on 07/10/25   CBC (No Diff)    " Collection Time: 07/10/25 10:54 AM    Specimen: Blood   Result Value Ref Range    WBC 8.17 3.40 - 10.80 10*3/mm3    RBC 5.01 3.77 - 5.28 10*6/mm3    Hemoglobin 14.0 12.0 - 15.9 g/dL    Hematocrit 42.7 34.0 - 46.6 %    MCV 85.2 79.0 - 97.0 fL    MCH 27.9 26.6 - 33.0 pg    MCHC 32.8 31.5 - 35.7 g/dL    RDW 13.6 12.3 - 15.4 %    RDW-SD 42.4 37.0 - 54.0 fl    MPV 9.5 6.0 - 12.0 fL    Platelets 253 140 - 450 10*3/mm3   Sedimentation Rate    Collection Time: 07/10/25 10:54 AM    Specimen: Blood   Result Value Ref Range    Sed Rate 21 (H) 0 - 20 mm/hr   C-reactive Protein    Collection Time: 07/10/25 10:54 AM    Specimen: Blood   Result Value Ref Range    C-Reactive Protein 0.31 0.00 - 0.50 mg/dL   Comprehensive Metabolic Panel    Collection Time: 07/10/25 10:54 AM    Specimen: Blood   Result Value Ref Range    Glucose 90 65 - 99 mg/dL    BUN 8.3 6.0 - 20.0 mg/dL    Creatinine 0.64 0.57 - 1.00 mg/dL    Sodium 137 136 - 145 mmol/L    Potassium 4.4 3.5 - 5.2 mmol/L    Chloride 103 98 - 107 mmol/L    CO2 24.0 22.0 - 29.0 mmol/L    Calcium 9.6 8.6 - 10.5 mg/dL    Total Protein 7.4 6.0 - 8.5 g/dL    Albumin 4.2 3.5 - 5.2 g/dL    ALT (SGPT) <5 1 - 33 U/L    AST (SGOT) 9 1 - 32 U/L    Alkaline Phosphatase 52 39 - 117 U/L    Total Bilirubin 0.2 0.0 - 1.2 mg/dL    Globulin 3.2 gm/dL    A/G Ratio 1.3 g/dL    BUN/Creatinine Ratio 13.0 7.0 - 25.0    Anion Gap 10.0 5.0 - 15.0 mmol/L    eGFR 110.5 >60.0 mL/min/1.73           ASSESSMENT AND PLAN    Assessment & Plan     Diagnoses and all orders for this visit:    1. Moderate chronic ulcerative colitis with complication (Primary)  Overview:  Initially diagnosed at 27 years old.  Was not followed routinely and received as needed steroids from ER visits.  In 2021, colonoscopy by Dr. Andrade at outside facility noted pancolitis.  Entyvio therapy was started every 8 weeks.  She was able to get off steroids.  Had a flare in July 2023.  Colonoscopy October 2023 showed pancolitis.  Hospitalized at  [facility] in [place] February 2024 and restarted on steroids at outside facility.  Entyvio was increased to every 4 weeks.  Then moved to the [facility] summer 2024.  Sought medical care at [facility] September 2024.    Orders:  -     CBC (No Diff)  -     Sedimentation Rate  -     C-reactive Protein  -     Comprehensive Metabolic Panel    2. Encounter for monitoring immunomodulating therapy  Overview:  Entyvio started 2021 in [place].  March 2024 increased every 4 weeks.  Patient moved to [place] that summer.  Entyvio restarted October 2024.  December 2024 plans made to switch to subcutaneous Entyvio 108 mg every 2 weeks    Hepatitis B- 2021  QuantiFERON-TB - October 2024      3. Diarrhea, unspecified type  -     Gastrointestinal Panel, PCR - Stool, Per Rectum; Future  -     Ova & Parasite Examination - Stool, Per Rectum; Future  -     Clostridioides difficile Toxin - Stool, Per Rectum; Future    4. Rectal bleeding  -     CBC (No Diff)      She is having most likely flare of ulcerative colitis.  She has been off entyvio since 2/2025 due to no insurance coverage.  I recommend check labs and stools studies. We discussed most likely will need steroids pending stool results.  I recommend emergency room if worsening or severe symptoms. I will contact her with lab results. She started a new job and will be eligible for health insurance 6 months from her start date.  She started her new job March 2025.  I did speak with the Entyvio pharmaceutical rep today (Mariama).  Mariama is going to have someone to contact our office regarding a program to help cover the cost of the Entyvio infusions.  She will require induction of Entyvio infusions.  Eventually she can go back to using the Entyvio pen however I would suggest submitting Entyvio pen when she has health insurance.         I discussed that while on immunomodulators I recommend keep up-to-date with recommended Pap smears per GYN due to the increased risk  of cervical dysplasia.  Also recommended see dermatology for skin evaluations yearly due to increased risk of skin cancer. Discussed vaccines as well ( pna, flu, and shingles). Avoid live vaccines.  Recommend yearly TB test.       * Surgery not found *           Return in about 1 month (around 8/10/2025).          There are no Patient Instructions on file for this visit.      Kallie Murillo, APRN

## 2025-07-11 ENCOUNTER — LAB (OUTPATIENT)
Dept: LAB | Facility: HOSPITAL | Age: 45
End: 2025-07-11

## 2025-07-11 DIAGNOSIS — R19.7 DIARRHEA, UNSPECIFIED TYPE: ICD-10-CM

## 2025-07-11 LAB
ADV 40+41 DNA STL QL NAA+NON-PROBE: NOT DETECTED
ASTRO TYP 1-8 RNA STL QL NAA+NON-PROBE: NOT DETECTED
C CAYETANENSIS DNA STL QL NAA+NON-PROBE: NOT DETECTED
C COLI+JEJ+UPSA DNA STL QL NAA+NON-PROBE: NOT DETECTED
C DIFF TOX GENS STL QL NAA+PROBE: NEGATIVE
CRYPTOSP DNA STL QL NAA+NON-PROBE: NOT DETECTED
E HISTOLYT DNA STL QL NAA+NON-PROBE: NOT DETECTED
EAEC PAA PLAS AGGR+AATA ST NAA+NON-PRB: NOT DETECTED
EC STX1+STX2 GENES STL QL NAA+NON-PROBE: NOT DETECTED
EPEC EAE GENE STL QL NAA+NON-PROBE: NOT DETECTED
ETEC LTA+ST1A+ST1B TOX ST NAA+NON-PROBE: NOT DETECTED
G LAMBLIA DNA STL QL NAA+NON-PROBE: NOT DETECTED
NOROVIRUS GI+II RNA STL QL NAA+NON-PROBE: NOT DETECTED
P SHIGELLOIDES DNA STL QL NAA+NON-PROBE: NOT DETECTED
RVA RNA STL QL NAA+NON-PROBE: NOT DETECTED
S ENT+BONG DNA STL QL NAA+NON-PROBE: NOT DETECTED
SAPO I+II+IV+V RNA STL QL NAA+NON-PROBE: NOT DETECTED
SHIGELLA SP+EIEC IPAH ST NAA+NON-PROBE: NOT DETECTED
V CHOL+PARA+VUL DNA STL QL NAA+NON-PROBE: NOT DETECTED
V CHOLERAE DNA STL QL NAA+NON-PROBE: NOT DETECTED
Y ENTEROCOL DNA STL QL NAA+NON-PROBE: NOT DETECTED

## 2025-07-11 PROCEDURE — 87507 IADNA-DNA/RNA PROBE TQ 12-25: CPT

## 2025-07-11 PROCEDURE — 87177 OVA AND PARASITES SMEARS: CPT

## 2025-07-11 PROCEDURE — 87493 C DIFF AMPLIFIED PROBE: CPT

## 2025-07-11 PROCEDURE — 87209 SMEAR COMPLEX STAIN: CPT

## 2025-07-14 ENCOUNTER — TELEPHONE (OUTPATIENT)
Dept: GASTROENTEROLOGY | Facility: CLINIC | Age: 45
End: 2025-07-14

## 2025-07-14 RX ORDER — METHYLPREDNISOLONE 4 MG/1
TABLET ORAL
Qty: 21 EACH | Refills: 0 | Status: SHIPPED | OUTPATIENT
Start: 2025-07-14 | End: 2025-07-18

## 2025-07-14 NOTE — TELEPHONE ENCOUNTER
Caller: Brandi Hsieh    Relationship to patient: Self    Best call back number: [phone]    Patient is needing: PT'S TEST RESULTS ARE IN FROM 7/10-7/11 AND WANTING TO SEE WHAT MEDICINE SHE CAN START NOW AND GET ON IT AS SOON AS POSSIBLE. PLEASE ADVISE.

## 2025-07-16 LAB
O+P SPEC MICRO: NORMAL
O+P STL CONC: NORMAL

## 2025-07-18 ENCOUNTER — TELEPHONE (OUTPATIENT)
Dept: GASTROENTEROLOGY | Facility: CLINIC | Age: 45
End: 2025-07-18

## 2025-07-18 RX ORDER — PREDNISONE 10 MG/1
TABLET ORAL
Qty: 70 TABLET | Refills: 0 | Status: SHIPPED | OUTPATIENT
Start: 2025-07-18 | End: 2025-08-15

## 2025-08-11 ENCOUNTER — OFFICE VISIT (OUTPATIENT)
Dept: GASTROENTEROLOGY | Facility: CLINIC | Age: 45
End: 2025-08-11
Payer: MEDICAID

## 2025-08-11 VITALS
HEART RATE: 118 BPM | HEIGHT: 67 IN | TEMPERATURE: 97.7 F | BODY MASS INDEX: 27.47 KG/M2 | SYSTOLIC BLOOD PRESSURE: 106 MMHG | OXYGEN SATURATION: 97 % | WEIGHT: 175 LBS | DIASTOLIC BLOOD PRESSURE: 70 MMHG

## 2025-08-11 DIAGNOSIS — K51.919 MODERATE CHRONIC ULCERATIVE COLITIS WITH COMPLICATION: Primary | ICD-10-CM

## 2025-08-11 DIAGNOSIS — Z79.60 ENCOUNTER FOR MONITORING IMMUNOMODULATING THERAPY: ICD-10-CM

## 2025-08-11 DIAGNOSIS — Z51.81 ENCOUNTER FOR MONITORING IMMUNOMODULATING THERAPY: ICD-10-CM

## 2025-08-12 RX ORDER — PREDNISONE 10 MG/1
TABLET ORAL
Qty: 70 TABLET | Refills: 0 | OUTPATIENT
Start: 2025-08-12 | End: 2025-09-09

## 2025-08-25 ENCOUNTER — TELEPHONE (OUTPATIENT)
Dept: GASTROENTEROLOGY | Facility: CLINIC | Age: 45
End: 2025-08-25

## 2025-08-25 DIAGNOSIS — K51.919 MODERATE CHRONIC ULCERATIVE COLITIS WITH COMPLICATION: Primary | ICD-10-CM

## 2025-08-25 RX ORDER — PREDNISONE 10 MG/1
TABLET ORAL
Qty: 42 TABLET | Refills: 0 | Status: SHIPPED | OUTPATIENT
Start: 2025-08-25 | End: 2025-09-22